# Patient Record
Sex: FEMALE | Race: WHITE | URBAN - METROPOLITAN AREA
[De-identification: names, ages, dates, MRNs, and addresses within clinical notes are randomized per-mention and may not be internally consistent; named-entity substitution may affect disease eponyms.]

---

## 2023-01-04 DIAGNOSIS — G56.03 BILATERAL CARPAL TUNNEL SYNDROME: Primary | ICD-10-CM

## 2023-01-05 ENCOUNTER — PROCEDURE VISIT (OUTPATIENT)
Dept: PHYSICAL MEDICINE AND REHAB | Age: 33
End: 2023-01-05

## 2023-01-05 VITALS — HEIGHT: 63 IN | BODY MASS INDEX: 20.38 KG/M2 | WEIGHT: 115 LBS

## 2023-01-05 DIAGNOSIS — G56.03 BILATERAL CARPAL TUNNEL SYNDROME: Primary | ICD-10-CM

## 2023-01-05 NOTE — PROGRESS NOTES
[facility]  Electrodiagnostic Laboratory  *Accredited by the [address] [place] [address] with exemplary status  [address] [facility] [place]  Phone: [phone]  Fax: [phone]    Referring Provider: LOUANN Kilpatrick*  Primary Care Physician: No primary care provider on file. Patient Name: Evelyne Watts  Patient YOB: 1990  Gender: female  BMI: Body mass index is 20.37 kg/m². Height 5' 3\" (1.6 m), weight 115 lb (52.2 kg). 1/6/2023    Reason for Referral: Carpal tunnel    Description of clinical problem:   Chief Complaint   Patient presents with    Extremity Pain     Pain in the wrists and into the thumbs. Right is worse than left. 2+ years of symp. Numbness     Numbness/tingling/burning in the entire hand and up the arm. Worse when sleeping. Extremity Weakness     Decrease strength. Worse in the morning. Sensory NCS      Nerve / Sites Rec. Site Peak Lat PP Amp Segments Distance Velocity Temp. ms µV  cm m/s °C   R Median - Digit II (Antidromic)      Palm Dig II 2.24 74.0 Palm - Dig II 7 48 32      Wrist Dig II 4.43* 51.5 Wrist - Dig II 14 40 32   L Median - Digit II (Antidromic)      Palm Dig II 2.19 81.1 Palm - Dig II 7 50 32      Wrist Dig II 4.11* 77.9 Wrist - Dig II 14 43 32   R Ulnar - Digit V (Antidromic)      Wrist Dig V 3.59 73.9 Wrist - Dig V 14 52 32   R Radial - Anatomical snuff box (Forearm)      Forearm Wrist 2.60 36.8 Forearm - Wrist 10 53 32       Motor NCS      Nerve / Sites Muscle Onset Amplitude Segments Distance Velocity Temp.     ms mV  cm m/s °C   R Median - APB      Palm APB 1.98 15.3 Palm - APB   32      Wrist APB 4.38 14.7 Wrist - Palm 8 33* 32      Elbow APB 7.97 13.8 Elbow - Wrist 18 50 32   L Median - APB      Palm APB 1.93 12.4 Palm - APB   32      Wrist APB 3.91 11.1 Wrist - Palm 8 40* 32      Elbow APB 7.29 11.0 Elbow - Wrist 18 53 32   R Ulnar - ADM      Wrist ADM 2.81 13.0 Wrist - ADM 8  32      B. Elbow ADM 5.63 12.6 B. Elbow - Wrist 18 64 32      A. Elbow ADM 7.14 12.2 A. Elbow - B. Elbow 10 66 32       F Wave      Nerve Fmin % F    ms %   R Median - APB 26.77 40   R Ulnar - ADM 26.15 10   L Median - APB 25.21 30       EMG      EMG Summary Table     Spontaneous MUAP Recruitment   Muscle Nerve Roots IA Fib PSW Fasc Amp Dur. PPP Pattern   R. Biceps brachii Musculocutaneous C5-C6 N None None None N N N N   R. Triceps brachii Radial C6-C8 N None None None N N N N   R. Pronator teres Median C6-C7 N None None None N N N N   R. First dorsal interosseous Ulnar C8-T1 N None None None N N N N   R. Abductor pollicis brevis Median U8-D9 N None None None N N N N   L. Biceps brachii Musculocutaneous C5-C6 N None None None N N N N   L. Triceps brachii Radial C6-C8 N None None None N N N N   L. Pronator teres Median C6-C7 N None None None N N N N   L. First dorsal interosseous Ulnar C8-T1 N None None None N N N N   L. Abductor pollicis brevis Median P3-Q6 N None None None N N N N        Study Limitations:  none    Summary of Findings:   Nerve conduction studies: The following nerve conduction studies were abnormal:   Bilateral combined sensory index was abnormal.   Bilateral median motor conduction velocity across the wrists are focally slow  All other nerve conduction studies, as listed in the table were normal in latency, amplitude and conduction velocity. Needle EMG:   Needle EMG was performed using a concentric needle. Observed motor units were normal in amplitude, duration, phases and recruitment and no active denervation signs were seen. Diagnostic Interpretation: This study was abnormal.     Electrodiagnosis: There is electrodiagnostic evidence of a median mononeuropathy.    Location: bilateral at the wrist.   Nathaly Gregory: [  ] Axonal   [ X ] Demyelinating  [  ] Mixed axonal and demyelinating     [  ] Sensory [  ] Motor               [ X ] Mixed sensorimotor     [  ] with active denervation       [ X ] without active denervation  Duration: Acute  Severity: moderate  Prognosis: Good. The prognosis for recovery of demyelinating lesions is good if the cause is alleviated. Previous Study: There is not a prior study for comparison. Follow up EMG is recommended if clinically warranted. Technologist: Cindy Saldivar  Physician:    Andrea Yost D.O., P.T. Board Certified Physical Medicine and Rehabilitation  Board Certified Electrodiagnostic Medicine      Nerve conduction studies and electromyography were performed according to our laboratory policies and procedures which can be provided upon request. All abnormal values are identified in the table. Laboratory normal values can also be provided upon request.       Cc: Carlos Mederos, LOUANN - C*  No primary care provider on file.

## 2023-01-05 NOTE — PATIENT INSTRUCTIONS
Electrodiagnotic Laboratory  [facility] the [facility] with Exemplary status  TRIP Sanchez D.O. [facility]  [address] [facility] [place]  Phone: [phone]  Fax: [phone]        Today you had an electrodiagnostic exam which included nerve conduction studies (NCS) and electromyography (EMG). This test evaluated the electrical activity of your nerves and muscles to help determine if you have a nerve or muscle disease. This test can help determine the location and type of a nerve or muscle problem. This will help your referring doctor diagnose your condition and determine the appropriate next step in your treatment plan. After your test:    1. There are no long lasting side effects of the test.     2. You may resume your normal activities without restrictions. 3.  Resume any medications that were stopped for the test.     4  If you have sore areas or bruising in your muscles where the needle was placed, apply a cold pack to the sore area for 15-20 minutes three to four times a day as needed for pain. The soreness should go away in about 1-2 days. 5. Your results were provided  Briefly at the end of your test and the final detailed report will be provided to your referring physician, and/or primary care physician and any other parties you requested within 1-2 days of the examination. You may wish to contact your referring provider after a few days to determine what they would like you to do next. 6.  Please call [phone] with any questions or concerns and if you develop increased body temperature/fever, swelling, tenderness, increased pain and/or drainage from the sites where the needle was placed. Thank you for choosing us for your health care needs.

## 2023-01-06 DIAGNOSIS — G56.03 BILATERAL CARPAL TUNNEL SYNDROME: ICD-10-CM

## 2023-01-06 NOTE — PROGRESS NOTES
[address]  Electrodiagnostic Laboratory  *Accredited by the [address] [place] [address] with exemplary status  [address] [facility] [place]  Phone: [phone]  Fax: [phone]      Date of Examination: 01/06/23  Patient Name: Thierno Arellano  is a 33y.o. year old female who was seen today regarding   Chief Complaint   Patient presents with    Extremity Pain     Pain in the wrists and into the thumbs. Right is worse than left. 2+ years of symp. Numbness     Numbness/tingling/burning in the entire hand and up the arm. Worse when sleeping. Extremity Weakness     Decrease strength. Worse in the morning. .  The symptoms started after repetitive work. The symptoms are intermittent. Previous workup has included: none. No past medical history on file. No past surgical history on file. There is not family history of neuromuscular conditions. ROS: There has been no associated vision change, hearing change, speech abnormality, swallowing abnormality, or bowel or bladder dysfunction. Physical Exam: General: The patient is in no apparent distress. Height 5' 3\" (1.6 m), weight 115 lb (52.2 kg). MSK: There is no joint effusion, deformity, instability, swelling, erythema or warmth. AROM is full in the spine and extremities. +Tinel bilateral wrists. Neurologic:  No focal sensorimotor deficit. Reflexes 2+ and symmetric. Gait is normal.    Impression:     1. Bilateral carpal tunnel syndrome        Plan:   EMG is indicated to evaluate the above diagnosis. Orders Placed This Encounter   Procedures    MO NEEDLE EMG EA EXTREMTY W/PARASPINL AREA COMPLETE    MO NERVE CONDUCTION STUDIES 7-8 STUDIES     EMG was done today and showed bilateral median mononeuropathy at the wrists, clinically consistent with carpal tunnel syndrome. The patient was educated about the diagnosis and the prognosis.    Recommend neutral wrist splints at h.s., OT and/or carpal tunnel injection and if no improvement after 4-6 weeks of conservative treatments consider orthopedic surgery evaluation. Ultrasound guided carpal tunnel injection can be performed in our office with order if desired. Recommend repeating the EMG in 1 year if symptoms persist.    Advised patient to follow up with referring provider. Thank you for allowing me to participate in the care of your patient.       Sincerely,     Yinka Rios, DO